# Patient Record
Sex: FEMALE | Race: WHITE | Employment: UNEMPLOYED | URBAN - METROPOLITAN AREA
[De-identification: names, ages, dates, MRNs, and addresses within clinical notes are randomized per-mention and may not be internally consistent; named-entity substitution may affect disease eponyms.]

---

## 2017-02-02 ENCOUNTER — APPOINTMENT (OUTPATIENT)
Dept: CT IMAGING | Age: 32
End: 2017-02-02
Attending: PHYSICIAN ASSISTANT
Payer: MEDICARE

## 2017-02-02 ENCOUNTER — HOSPITAL ENCOUNTER (EMERGENCY)
Dept: CT IMAGING | Age: 32
Discharge: HOME | End: 2017-02-02
Attending: PHYSICIAN ASSISTANT
Payer: MEDICARE

## 2017-02-02 ENCOUNTER — HOSPITAL ENCOUNTER (EMERGENCY)
Age: 32
Discharge: LEFT BEFORE DISCHARGE | End: 2017-02-02
Attending: EMERGENCY MEDICINE
Payer: MEDICARE

## 2017-02-02 VITALS
RESPIRATION RATE: 19 BRPM | SYSTOLIC BLOOD PRESSURE: 117 MMHG | TEMPERATURE: 99.2 F | OXYGEN SATURATION: 98 % | WEIGHT: 115 LBS | HEIGHT: 62 IN | DIASTOLIC BLOOD PRESSURE: 72 MMHG | HEART RATE: 102 BPM | BODY MASS INDEX: 21.16 KG/M2

## 2017-02-02 DIAGNOSIS — R22.1 NECK SWELLING: Primary | ICD-10-CM

## 2017-02-02 LAB
ANION GAP BLD CALC-SCNC: 7 MMOL/L (ref 5–15)
APPEARANCE UR: CLEAR
BACTERIA URNS QL MICRO: NEGATIVE /HPF
BASOPHILS # BLD AUTO: 0 K/UL (ref 0–0.1)
BASOPHILS # BLD: 0 % (ref 0–1)
BILIRUB UR QL: NEGATIVE
BUN SERPL-MCNC: 9 MG/DL (ref 6–20)
BUN/CREAT SERPL: 10 (ref 12–20)
CALCIUM SERPL-MCNC: 8.4 MG/DL (ref 8.5–10.1)
CHLORIDE SERPL-SCNC: 96 MMOL/L (ref 97–108)
CO2 SERPL-SCNC: 31 MMOL/L (ref 21–32)
COLOR UR: NORMAL
CREAT SERPL-MCNC: 0.93 MG/DL (ref 0.55–1.02)
EOSINOPHIL # BLD: 0.1 K/UL (ref 0–0.4)
EOSINOPHIL NFR BLD: 1 % (ref 0–7)
EPITH CASTS URNS QL MICRO: NORMAL /LPF
ERYTHROCYTE [DISTWIDTH] IN BLOOD BY AUTOMATED COUNT: 15.3 % (ref 11.5–14.5)
GLUCOSE SERPL-MCNC: 90 MG/DL (ref 65–100)
GLUCOSE UR STRIP.AUTO-MCNC: NEGATIVE MG/DL
HCG UR QL: NEGATIVE
HCT VFR BLD AUTO: 33.9 % (ref 35–47)
HGB BLD-MCNC: 11 G/DL (ref 11.5–16)
HGB UR QL STRIP: NEGATIVE
HYALINE CASTS URNS QL MICRO: NORMAL /LPF (ref 0–5)
KETONES UR QL STRIP.AUTO: NEGATIVE MG/DL
LACTATE SERPL-SCNC: 0.9 MMOL/L (ref 0.4–2)
LEUKOCYTE ESTERASE UR QL STRIP.AUTO: NEGATIVE
LYMPHOCYTES # BLD AUTO: 22 % (ref 12–49)
LYMPHOCYTES # BLD: 2.4 K/UL (ref 0.8–3.5)
MCH RBC QN AUTO: 26.4 PG (ref 26–34)
MCHC RBC AUTO-ENTMCNC: 32.4 G/DL (ref 30–36.5)
MCV RBC AUTO: 81.3 FL (ref 80–99)
MONOCYTES # BLD: 0.7 K/UL (ref 0–1)
MONOCYTES NFR BLD AUTO: 7 % (ref 5–13)
NEUTS SEG # BLD: 7.5 K/UL (ref 1.8–8)
NEUTS SEG NFR BLD AUTO: 70 % (ref 32–75)
NITRITE UR QL STRIP.AUTO: NEGATIVE
PH UR STRIP: 6 [PH] (ref 5–8)
PLATELET # BLD AUTO: 268 K/UL (ref 150–400)
POTASSIUM SERPL-SCNC: 3.8 MMOL/L (ref 3.5–5.1)
PROT UR STRIP-MCNC: NEGATIVE MG/DL
RBC # BLD AUTO: 4.17 M/UL (ref 3.8–5.2)
RBC #/AREA URNS HPF: NORMAL /HPF (ref 0–5)
SODIUM SERPL-SCNC: 134 MMOL/L (ref 136–145)
SP GR UR REFRACTOMETRY: 1.01 (ref 1–1.03)
UA: UC IF INDICATED,UAUC: NORMAL
UROBILINOGEN UR QL STRIP.AUTO: 0.2 EU/DL (ref 0.2–1)
WBC # BLD AUTO: 10.7 K/UL (ref 3.6–11)
WBC URNS QL MICRO: NORMAL /HPF (ref 0–4)

## 2017-02-02 PROCEDURE — 85025 COMPLETE CBC W/AUTO DIFF WBC: CPT | Performed by: PHYSICIAN ASSISTANT

## 2017-02-02 PROCEDURE — 99282 EMERGENCY DEPT VISIT SF MDM: CPT

## 2017-02-02 PROCEDURE — 80048 BASIC METABOLIC PNL TOTAL CA: CPT | Performed by: PHYSICIAN ASSISTANT

## 2017-02-02 PROCEDURE — 74011250636 HC RX REV CODE- 250/636: Performed by: PHYSICIAN ASSISTANT

## 2017-02-02 PROCEDURE — 36415 COLL VENOUS BLD VENIPUNCTURE: CPT | Performed by: PHYSICIAN ASSISTANT

## 2017-02-02 PROCEDURE — 96360 HYDRATION IV INFUSION INIT: CPT

## 2017-02-02 PROCEDURE — 81001 URINALYSIS AUTO W/SCOPE: CPT | Performed by: PHYSICIAN ASSISTANT

## 2017-02-02 PROCEDURE — 83605 ASSAY OF LACTIC ACID: CPT | Performed by: PHYSICIAN ASSISTANT

## 2017-02-02 PROCEDURE — 70491 CT SOFT TISSUE NECK W/DYE: CPT

## 2017-02-02 PROCEDURE — 81025 URINE PREGNANCY TEST: CPT

## 2017-02-02 RX ORDER — KETOROLAC TROMETHAMINE 30 MG/ML
60 INJECTION, SOLUTION INTRAMUSCULAR; INTRAVENOUS
Status: DISCONTINUED | OUTPATIENT
Start: 2017-02-02 | End: 2017-02-02 | Stop reason: HOSPADM

## 2017-02-02 RX ADMIN — SODIUM CHLORIDE 1000 ML: 900 INJECTION, SOLUTION INTRAVENOUS at 04:02

## 2017-02-02 NOTE — ED NOTES
Attempted U/S guide PIV to RUE, immediately upon stick pt c/o pain running down arm into fingers. Attempt stopped and PIV removed to find another site. Additional attempt successful to LUE 20g PIV by ultrasound, all blood work and 1 set of blood cultures collected. Primary RN aware.      Ramonita Saucedo RN

## 2017-02-02 NOTE — ED TRIAGE NOTES
Pt. States strained 2 weeks ago really hard and felt a pop in right side of neck, states has become bigger over the past two weeks, was seen at [facility] last night but was looking at her abdomen. Pt. Has ice pack on neck at this time.

## 2017-02-02 NOTE — ED NOTES
Upon entering to attempt another US guide PIV pt standing at door requesting other iv be removed so she can just go. Pt stated she told that asia \"to be easy with it\". ..\"but he jerked it out\". Pt also stated she didn't \"come here for Toradol I can get ibuprofen at home\". Pt went on to state \"I should have never come to a [facility], I just get judged\". I informed pt that I do not  anyone and am here to help and I believe everyone here feels the same way. \" Pt then preceded to hug me and say thank you but still started towards the door. Brother speaking with this writer regarding this situation. Informed brother that we are here to help her and no judgement is being made. Provider needs the CT with contrast and her IV has infiltrated. Brother admits she has \"terrible veins like my mother\" and Betzaida Macedo has been poked for 2-3 hours at other places the past couple of days\". Pt speaking with brothers to determine what she wants to do. Will inform primary RN.

## 2017-02-02 NOTE — ED PROVIDER NOTES
HPI Comments: Juno Harden is a 28 y.o. female who presents ambulatory to ER with c/o right sided neck swelling and pain x 2 weeks. Pt notes area started off as \"a small pimple\" 2 weeks ago and has progressively incraesed in size since. States she kept picking at the area which in turn made the swelling and painful area increase in size. States she even tried picking at the area with a needle without relief in swelling/pain. Notes 5 days ago she was straining to have a BM due to her chronic constipation and she felt a \"pop\" in the right side of her neck with associated significant increase in the pain and swelling. Denies any drainage, redness, difficulty breathing/swallowing. Notes she has been having fevers for past 3-4 nights \"as high as 105.4 last night\". Denies taking any medicine for her fevers and states they resolve on their own. States earlier tonight she had a temp of 101.1, unsure what time. Notes pain became so bad tonight she couldn't take it anymore so came to ER. States she was seen at [facility] last evening and went there for the neck pain \"but when I told them it got worse with straining they focused on my stomach pain and constipation and didn't even look at my neck\". States she ended up leaving before completing care \"bc they tried to give me an enema\". States does have issue with chronic constipation and seeing GI for this. Does not want any evaluation of her abdomen tonight. Denies trouble breathing or swallowing due to swelling. Denies any IV drug use. Denies injecting anything into her neck. Denies any similar sx before two weeks ago. She specifically denies any nausea, vomiting, chest pain, shortness of breath, headache, rash, diarrhea, abdominal pain, urinary/bowel changes, sweating or weight loss.     PCP: Jerome To, NP   PMHx significant for: Past Medical History:    Cholelithiasis                                                Infections of kidney Kidney stones                                                 Seizures ([facility] [address]                                                  Comment:seizure from concussion in [facility] 2012    PSHx significant for: Past Surgical History:    HX TONSIL AND ADENOIDECTOMY                                    HX  SECTION                                                -- Hydrocodone -- Nausea Only   -- Penicillin G -- Rash   -- Shellfish Derived -- Rash   -- Tylenol (Acetaminophen) -- Nausea Only    There are no other complaints, changes or physical findings at this time. The history is provided by the patient. Past Medical History:   Diagnosis Date    Cholelithiasis     Infections of kidney     Kidney stones     Seizures ([facility] [address]      seizure from concussion in [facility]        Past Surgical History:   Procedure Laterality Date    Hx tonsil and adenoidectomy      Hx  section           No family history on file. Social History     Social History    Marital status: LEGALLY      Spouse name: N/A    Number of children: N/A    Years of education: N/A     Occupational History    Not on file. Social History Main Topics    Smoking status: Current Every Day Smoker    Smokeless tobacco: Not on file    Alcohol use No      Comment: pt denies    Drug use: No      Comment: pt denies    Sexual activity: Not on file     Other Topics Concern    Not on file     Social History Narrative         ALLERGIES: Hydrocodone; Penicillin g; Shellfish derived; and Tylenol [acetaminophen]    Review of Systems   Constitutional: Positive for chills and fever. Negative for appetite change and fatigue. HENT: Negative. Negative for congestion and sore throat. Eyes: Negative. Negative for visual disturbance. Respiratory: Negative. Negative for cough and shortness of breath. Cardiovascular: Negative. Negative for chest pain, palpitations and leg swelling. Gastrointestinal: Negative.   Negative for abdominal pain, constipation, diarrhea, nausea and vomiting. Genitourinary: Negative. Negative for dysuria, flank pain and hematuria. Musculoskeletal: Positive for neck pain (swelling). Negative for back pain. Skin: Negative. Negative for rash. Neurological: Negative. Negative for dizziness, syncope, weakness, numbness and headaches. Hematological: Negative. Psychiatric/Behavioral: Negative. All other systems reviewed and are negative. Vitals:    02/02/17 0122   BP: 117/72   Pulse: (!) 102   Resp: 19   Temp: 99.2 °F (37.3 °C)   SpO2: 98%   Weight: 52.2 kg (115 lb)   Height: 5' 2\" (1.575 m)            Physical Exam   Constitutional: She is oriented to person, place, and time. She appears well-developed and well-nourished. No distress. HENT:   Head: Normocephalic and atraumatic. Right Ear: Hearing, tympanic membrane, external ear and ear canal normal.   Left Ear: Hearing, tympanic membrane, external ear and ear canal normal.   Nose: Nose normal. No rhinorrhea. Right sinus exhibits no maxillary sinus tenderness and no frontal sinus tenderness. Left sinus exhibits no maxillary sinus tenderness and no frontal sinus tenderness. Mouth/Throat: Uvula is midline and mucous membranes are normal. No oropharyngeal exudate, posterior oropharyngeal edema, posterior oropharyngeal erythema or tonsillar abscesses. Dry mucous membranes   Neck: Normal range of motion. No tracheal tenderness and no spinous process tenderness present. No rigidity. Edema present. No tracheal deviation and no erythema present. Cardiovascular: Normal rate and normal heart sounds. Exam reveals no gallop and no friction rub. No murmur heard. Pulmonary/Chest: Effort normal and breath sounds normal. No accessory muscle usage or stridor. No respiratory distress. She has no decreased breath sounds. She has no wheezes. She has no rhonchi. She has no rales. She exhibits no tenderness. Abdominal: Soft.  Normal appearance and bowel sounds are normal. She exhibits no distension. There is no tenderness. There is no rebound, no guarding and no CVA tenderness. Musculoskeletal: Normal range of motion. Lymphadenopathy:     She has no cervical adenopathy. Neurological: She is alert and oriented to person, place, and time. Skin: Skin is warm and dry. No rash noted. She is not diaphoretic. Psychiatric: She has a normal mood and affect. Her behavior is normal.   Nursing note and vitals reviewed. MDM  Number of Diagnoses or Management Options  Diagnosis management comments: DDx: abscess, soft tissue mass, goiter       Amount and/or Complexity of Data Reviewed  Clinical lab tests: ordered and reviewed  Tests in the radiology section of CPT®: ordered and reviewed  Discuss the patient with other providers: yes (Dr. Rosalva Clay)    Patient Progress  Patient progress: stable    ED Course       Procedures            1:47 AM   Kvng Dickinson PA-C discussed patient with Jones Craig MD who is in agreement with care plan as outlined. Further recommends lactic acid. Will be in to see the patient. Kvng Dickinson PA-C               2:04 AM  Change of shift. Discussed pt's hx, disposition, and available diagnostic and imaging results with Jones Craig MD . Reviewed care plans. Jones Craig MD  agrees with plans as outlined. Care of patient signed over to Jones Craig MD.  Handoff complete.  Kvng Dickinson PA-C

## 2022-09-02 ENCOUNTER — TRANSCRIBE ORDER (OUTPATIENT)
Dept: SCHEDULING | Age: 37
End: 2022-09-02

## 2022-09-02 DIAGNOSIS — R19.00 ABDOMINAL LUMP: Primary | ICD-10-CM

## 2022-09-05 ENCOUNTER — TRANSCRIBE ORDER (OUTPATIENT)
Dept: SCHEDULING | Age: 37
End: 2022-09-05

## 2022-09-05 DIAGNOSIS — M25.539 PAIN IN JOINT, FOREARM: ICD-10-CM

## 2022-09-05 DIAGNOSIS — R19.00 ABDOMINAL SWELLING: Primary | ICD-10-CM

## 2023-04-21 DIAGNOSIS — R19.00 ABDOMINAL SWELLING: Primary | ICD-10-CM

## 2023-12-01 ENCOUNTER — INITIAL PRENATAL (OUTPATIENT)
Age: 38
End: 2023-12-01

## 2023-12-01 ENCOUNTER — TELEPHONE (OUTPATIENT)
Age: 38
End: 2023-12-01

## 2023-12-01 VITALS
SYSTOLIC BLOOD PRESSURE: 128 MMHG | HEART RATE: 89 BPM | DIASTOLIC BLOOD PRESSURE: 82 MMHG | HEIGHT: 63 IN | BODY MASS INDEX: 21.97 KG/M2 | WEIGHT: 124 LBS

## 2023-12-01 DIAGNOSIS — O09.529 ENCOUNTER FOR SUPERVISION OF HIGH-RISK PREGNANCY WITH ELDERLY MULTIGRAVIDA: ICD-10-CM

## 2023-12-01 DIAGNOSIS — Z34.91 ENCOUNTER FOR PREGNANCY RELATED EXAMINATION IN FIRST TRIMESTER: Primary | ICD-10-CM

## 2023-12-01 DIAGNOSIS — Z3A.08 8 WEEKS GESTATION OF PREGNANCY: Primary | ICD-10-CM

## 2023-12-01 DIAGNOSIS — B18.2: ICD-10-CM

## 2023-12-01 LAB
AMPHET UR QL SCN: NEGATIVE
BARBITURATES UR QL SCN: NEGATIVE
BENZODIAZ UR QL: NEGATIVE
CANNABINOIDS UR QL SCN: POSITIVE
COCAINE UR QL SCN: NEGATIVE
Lab: ABNORMAL
METHADONE UR QL: NEGATIVE
OPIATES UR QL: NEGATIVE
PCP UR QL: NEGATIVE

## 2023-12-01 RX ORDER — BUPRENORPHINE 8 MG/1
TABLET SUBLINGUAL
COMMUNITY
Start: 2022-09-02

## 2023-12-01 RX ORDER — ONDANSETRON 4 MG/1
4 TABLET, ORALLY DISINTEGRATING ORAL EVERY 8 HOURS PRN
Qty: 30 TABLET | Refills: 3 | Status: SHIPPED | OUTPATIENT
Start: 2023-12-01

## 2023-12-01 NOTE — TELEPHONE ENCOUNTER
Two patient identifiers used        Boyfriend advised of need for patient to update hippa form to be able to talk with him   41year old patient  7w5d pregnant seen in the office today    Patient is calling to ask for a prescription for prenatal vitamins to be sent to her [facility] in [place] today since her pharmacy is closed over the weekend    Thank you

## 2023-12-01 NOTE — TELEPHONE ENCOUNTER
Received an order for pt to be seen In 4 weeks. Tried to call pt to confirm appointment. Called both numbers on the pt's profile, one number was out of service and the other just rung. I could not leave a vm and pt does not currently have her mychart set up.

## 2023-12-01 NOTE — TELEPHONE ENCOUNTER
Patient was called back to let her know MD has gone for today and she can send prescription on Monday when she returns      Patient verbalized understanding.

## 2023-12-01 NOTE — TELEPHONE ENCOUNTER
Tried reaching out to patient as [facility] notified me that they've tried reaching pt as well to advise of upcoming appointment 12/29/23. One phone number just rings, no voicemail available. And the other phone number is not in service. If pt calls back, please advise that VAISHALI has her scheduled on December 29th. 1pm to meet with our genetic counselor (if she chooses since she's over the age of 29) and 1:45pm for an ultrasound.  She will also see Dr. Oli Frye that same day at 3pm.     Please confirm and update any phone numbers in patient chart

## 2023-12-01 NOTE — TELEPHONE ENCOUNTER
Patient was reached through boyfriend phone and her cell phone and home phone confirmed as correct numbers    Patient was told that that her cell phone number is not going through, patient states she will check into this and for now we are to contact her via the home phone number of [phone]    This nurse hung up with patient and called the home phone and patent answered    Patient was advised of maternal fetal medicine appointments on 12/29/2023 at 1:00pm, 1:45 for ultrasound and then to see Dr. Sofy Long at 3:00PM    Patient verbalized understanding.

## 2023-12-02 LAB
ERYTHROCYTE [DISTWIDTH] IN BLOOD BY AUTOMATED COUNT: 15.1 % (ref 11.7–15.4)
HBV SURFACE AG SERPL QL IA: NEGATIVE
HCT VFR BLD AUTO: 39.2 % (ref 34–46.6)
HGB BLD-MCNC: 12.5 G/DL (ref 11.1–15.9)
HIV 1+2 AB+HIV1 P24 AG SERPL QL IA: NON REACTIVE
MCH RBC QN AUTO: 26.3 PG (ref 26.6–33)
MCHC RBC AUTO-ENTMCNC: 31.9 G/DL (ref 31.5–35.7)
MCV RBC AUTO: 83 FL (ref 79–97)
PLATELET # BLD AUTO: 149 X10E3/UL (ref 150–450)
RBC # BLD AUTO: 4.75 X10E6/UL (ref 3.77–5.28)
RUBV IGG SERPL IA-ACNC: 3.28 INDEX
VZV IGG SER IA-ACNC: 2589 INDEX
WBC # BLD AUTO: 5 X10E3/UL (ref 3.4–10.8)

## 2023-12-04 RX ORDER — PNV NO.95/FERROUS FUM/FOLIC AC 28MG-0.8MG
1 TABLET ORAL DAILY
Qty: 30 TABLET | Refills: 11 | Status: SHIPPED | OUTPATIENT
Start: 2023-12-04

## 2023-12-05 LAB
ABO GROUP BLD: NORMAL
BACTERIA UR CULT: NO GROWTH
BLD GP AB SCN SERPL QL: NEGATIVE
HGB A MFR BLD ELPH: 97.6 % (ref 96.4–98.8)
HGB A2 MFR BLD ELPH: 2.4 % (ref 1.8–3.2)
HGB F MFR BLD ELPH: 0 % (ref 0–2)
HGB FRACT BLD-IMP: NORMAL
HGB S MFR BLD ELPH: 0 %
RH BLD: POSITIVE
TREPONEMA PALLIDUM IGG+IGM AB [PRESENCE] IN SERUM OR PLASMA BY IMMUNOASSAY: NON REACTIVE

## 2023-12-06 LAB
DIAGNOSTIC IMP SPEC-IMP: NORMAL
HCV IGG SERPL QL IA: REACTIVE
HCV RNA SERPL NAA+PROBE-ACNC: NORMAL IU/ML
REF LAB TEST REF RANGE: NORMAL

## 2023-12-07 LAB
C TRACH RRNA CVX QL NAA+PROBE: NEGATIVE
CYTOLOGIST CVX/VAG CYTO: NORMAL
CYTOLOGY CVX/VAG DOC CYTO: NORMAL
CYTOLOGY CVX/VAG DOC THIN PREP: NORMAL
DX ICD CODE: NORMAL
HPV GENOTYPE REFLEX: NORMAL
HPV I/H RISK 4 DNA CVX QL PROBE+SIG AMP: NEGATIVE
Lab: NORMAL
N GONORRHOEA RRNA CVX QL NAA+PROBE: NEGATIVE
OTHER STN SPEC: NORMAL
STAT OF ADQ CVX/VAG CYTO-IMP: NORMAL
T VAGINALIS RRNA SPEC QL NAA+PROBE: NEGATIVE

## 2024-01-02 ENCOUNTER — TELEPHONE (OUTPATIENT)
Age: 39
End: 2024-01-02

## 2024-01-02 NOTE — TELEPHONE ENCOUNTER
Attempted to call patient to advise of missed FOB appt 12/29/23.     If pt calls back, please schedule fob appt.